# Patient Record
Sex: FEMALE | Race: BLACK OR AFRICAN AMERICAN
[De-identification: names, ages, dates, MRNs, and addresses within clinical notes are randomized per-mention and may not be internally consistent; named-entity substitution may affect disease eponyms.]

---

## 2018-08-19 ENCOUNTER — HOSPITAL ENCOUNTER (INPATIENT)
Dept: HOSPITAL 61 - ER | Age: 68
LOS: 1 days | Discharge: HOME | DRG: 287 | End: 2018-08-20
Attending: INTERNAL MEDICINE | Admitting: INTERNAL MEDICINE
Payer: COMMERCIAL

## 2018-08-19 VITALS — SYSTOLIC BLOOD PRESSURE: 107 MMHG | DIASTOLIC BLOOD PRESSURE: 69 MMHG

## 2018-08-19 VITALS — DIASTOLIC BLOOD PRESSURE: 66 MMHG | SYSTOLIC BLOOD PRESSURE: 111 MMHG

## 2018-08-19 VITALS — BODY MASS INDEX: 28.36 KG/M2 | WEIGHT: 160.06 LBS | HEIGHT: 63 IN

## 2018-08-19 VITALS — SYSTOLIC BLOOD PRESSURE: 128 MMHG | DIASTOLIC BLOOD PRESSURE: 72 MMHG

## 2018-08-19 VITALS — DIASTOLIC BLOOD PRESSURE: 71 MMHG | SYSTOLIC BLOOD PRESSURE: 109 MMHG

## 2018-08-19 DIAGNOSIS — I25.110: Primary | ICD-10-CM

## 2018-08-19 DIAGNOSIS — E78.5: ICD-10-CM

## 2018-08-19 DIAGNOSIS — Z88.2: ICD-10-CM

## 2018-08-19 DIAGNOSIS — K21.9: ICD-10-CM

## 2018-08-19 DIAGNOSIS — Z95.5: ICD-10-CM

## 2018-08-19 DIAGNOSIS — I25.2: ICD-10-CM

## 2018-08-19 DIAGNOSIS — Z82.49: ICD-10-CM

## 2018-08-19 DIAGNOSIS — I10: ICD-10-CM

## 2018-08-19 DIAGNOSIS — Z88.1: ICD-10-CM

## 2018-08-19 LAB
ANION GAP SERPL CALC-SCNC: 10 MMOL/L (ref 6–14)
APTT BLD: 28 SEC (ref 24–38)
BASOPHILS # BLD AUTO: 0 X10^3/UL (ref 0–0.2)
BASOPHILS NFR BLD: 1 % (ref 0–3)
BUN SERPL-MCNC: 24 MG/DL (ref 7–20)
CALCIUM SERPL-MCNC: 8.9 MG/DL (ref 8.5–10.1)
CHLORIDE SERPL-SCNC: 103 MMOL/L (ref 98–107)
CO2 SERPL-SCNC: 27 MMOL/L (ref 21–32)
CREAT SERPL-MCNC: 0.7 MG/DL (ref 0.6–1)
EOSINOPHIL NFR BLD: 0.1 X10^3/UL (ref 0–0.7)
EOSINOPHIL NFR BLD: 2 % (ref 0–3)
ERYTHROCYTE [DISTWIDTH] IN BLOOD BY AUTOMATED COUNT: 14 % (ref 11.5–14.5)
GFR SERPLBLD BASED ON 1.73 SQ M-ARVRAT: 83.7 ML/MIN
GLUCOSE SERPL-MCNC: 105 MG/DL (ref 70–99)
HCT VFR BLD CALC: 42.8 % (ref 36–47)
HGB BLD-MCNC: 14.4 G/DL (ref 12–15.5)
LYMPHOCYTES # BLD: 1.8 X10^3/UL (ref 1–4.8)
LYMPHOCYTES NFR BLD AUTO: 32 % (ref 24–48)
MCH RBC QN AUTO: 30 PG (ref 25–35)
MCHC RBC AUTO-ENTMCNC: 34 G/DL (ref 31–37)
MCV RBC AUTO: 90 FL (ref 79–100)
MONO #: 0.4 X10^3/UL (ref 0–1.1)
MONOCYTES NFR BLD: 8 % (ref 0–9)
NEUT #: 3.1 X10^3UL (ref 1.8–7.7)
NEUTROPHILS NFR BLD AUTO: 57 % (ref 31–73)
PLATELET # BLD AUTO: 187 X10^3/UL (ref 140–400)
POTASSIUM SERPL-SCNC: 3.6 MMOL/L (ref 3.5–5.1)
PROTHROMBIN TIME: 12.1 SEC (ref 11.7–14)
RBC # BLD AUTO: 4.75 X10^6/UL (ref 3.5–5.4)
SODIUM SERPL-SCNC: 140 MMOL/L (ref 136–145)
WBC # BLD AUTO: 5.4 X10^3/UL (ref 4–11)

## 2018-08-19 PROCEDURE — 85610 PROTHROMBIN TIME: CPT

## 2018-08-19 PROCEDURE — 36415 COLL VENOUS BLD VENIPUNCTURE: CPT

## 2018-08-19 PROCEDURE — 99152 MOD SED SAME PHYS/QHP 5/>YRS: CPT

## 2018-08-19 PROCEDURE — 71045 X-RAY EXAM CHEST 1 VIEW: CPT

## 2018-08-19 PROCEDURE — C1892 INTRO/SHEATH,FIXED,PEEL-AWAY: HCPCS

## 2018-08-19 PROCEDURE — 99153 MOD SED SAME PHYS/QHP EA: CPT

## 2018-08-19 PROCEDURE — C1769 GUIDE WIRE: HCPCS

## 2018-08-19 PROCEDURE — 93306 TTE W/DOPPLER COMPLETE: CPT

## 2018-08-19 PROCEDURE — G0269 OCCLUSIVE DEVICE IN VEIN ART: HCPCS

## 2018-08-19 PROCEDURE — 80048 BASIC METABOLIC PNL TOTAL CA: CPT

## 2018-08-19 PROCEDURE — 85730 THROMBOPLASTIN TIME PARTIAL: CPT

## 2018-08-19 PROCEDURE — 93458 L HRT ARTERY/VENTRICLE ANGIO: CPT

## 2018-08-19 PROCEDURE — C1771 REP DEV, URINARY, W/SLING: HCPCS

## 2018-08-19 PROCEDURE — 85025 COMPLETE CBC W/AUTO DIFF WBC: CPT

## 2018-08-19 PROCEDURE — 83880 ASSAY OF NATRIURETIC PEPTIDE: CPT

## 2018-08-19 PROCEDURE — 93005 ELECTROCARDIOGRAM TRACING: CPT

## 2018-08-19 PROCEDURE — 84484 ASSAY OF TROPONIN QUANT: CPT

## 2018-08-19 PROCEDURE — 85520 HEPARIN ASSAY: CPT

## 2018-08-19 RX ADMIN — NITROGLYCERIN PRN MG: 0.4 TABLET SUBLINGUAL at 09:37

## 2018-08-19 RX ADMIN — METOPROLOL SUCCINATE SCH MG: 25 TABLET, EXTENDED RELEASE ORAL at 11:59

## 2018-08-19 RX ADMIN — PANTOPRAZOLE SODIUM SCH MG: 40 TABLET, DELAYED RELEASE ORAL at 11:59

## 2018-08-19 RX ADMIN — VERAPAMIL HYDROCHLORIDE SCH MG: 180 TABLET, FILM COATED, EXTENDED RELEASE ORAL at 11:59

## 2018-08-19 RX ADMIN — NITROGLYCERIN PRN MG: 0.4 TABLET SUBLINGUAL at 09:00

## 2018-08-19 NOTE — PHYS DOC
Adult General


Chief Complaint


Chief Complaint:  CHEST PAIN-CARDIAC NATURE





HPI


HPI





Patient is a 68  year old female who presents with chest pain.  Patient c/o 

sternal chest pressure which has been stuttering over the last couple weeks.  

Pain became more constant and severe last night and persisted until she awoke 

this am.  No shortness of breath.  No KING, orthopnea, or LE edema.  No 

aggravating or alleviating factors.  Patient had MI in 2011 and states she had 

one stent placed.  No recent fever, chills, cough.





Review of Systems


Review of Systems





Constitutional: Denies fever or chills 


Eyes: Denies change in visual acuity, redness, or eye pain 


HENT: Denies nasal congestion or sore throat 


Respiratory: Denies cough or shortness of breath 


Cardiovascular: No additional information not addressed in HPI 


GI: Denies abdominal pain, nausea, vomiting, bloody stools or diarrhea 


: Denies dysuria or hematuria 


Musculoskeletal: Denies back pain or joint pain 


Integument: Denies rash or skin lesions 


Neurologic: Denies headache, focal weakness or sensory changes 


Endocrine: Denies polyuria or polydipsia 





All other systems were reviewed and found to be within normal limits, except as 

documented in this note.





Current Medications


Current Medications





Current Medications








 Medications


  (Trade)  Dose


 Ordered  Sig/Artur  Start Time


 Stop Time Status Last Admin


Dose Admin


 


 Aspirin


  (Children'S


 Aspirin)  324 mg  1X  ONCE  8/19/18 09:00


 8/19/18 09:02 DC 8/19/18 09:00


324 MG


 


 Nitroglycerin


  (Nitrostat)  0.4 mg  PRN Q5MIN  PRN  8/19/18 08:45


 8/19/18 09:52 DC 8/19/18 09:37


0.4 MG











Allergies


Allergies





Allergies








Coded Allergies Type Severity Reaction Last Updated Verified


 


  Sulfa (Sulfonamide Antibiotics) Allergy Intermediate Hives 8/19/18 Yes


 


  erythromycin base Adverse Reaction Intermediate Nausea and Vomiting 8/19/18 

Yes











Physical Exam


Physical Exam





Constitutional: Well developed, well nourished, no acute distress, non-toxic 

appearance


HENT: Normocephalic, atraumatic, bilateral external ears normal, oropharynx 

moist


Eyes: PERRLA, EOMI, conjunctiva normal 


Neck: Normal range of motion, no tenderness 


Cardiovascular:Heart rate regular rhythm, no murmur 


Lungs & Thorax:  Bilateral breath sounds clear to auscultation 


Abdomen: Bowel sounds normal, soft 


Skin: Warm, dry, no erythema, no rash 


Back: No tenderness, no CVA tenderness 


Extremities: trace edema bilateral LE's 


Neurologic: Alert and oriented X 3


Psychologic: Affect normal





Current Patient Data


Vital Signs





 Vital Signs








  Date Time  Temp Pulse Resp B/P (MAP) Pulse Ox O2 Delivery O2 Flow Rate FiO2


 


8/19/18 08:27 97.7 68 18 164/89 (114) 100 Room Air  





 97.7       








Lab Values





 Laboratory Tests








Test


 8/19/18


08:40


 


White Blood Count


 5.4 x10^3/uL


(4.0-11.0)


 


Red Blood Count


 4.75 x10^6/uL


(3.50-5.40)


 


Hemoglobin


 14.4 g/dL


(12.0-15.5)


 


Hematocrit


 42.8 %


(36.0-47.0)


 


Mean Corpuscular Volume


 90 fL ()





 


Mean Corpuscular Hemoglobin 30 pg (25-35)  


 


Mean Corpuscular Hemoglobin


Concent 34 g/dL


(31-37)


 


Red Cell Distribution Width


 14.0 %


(11.5-14.5)


 


Platelet Count


 187 x10^3/uL


(140-400)


 


Neutrophils (%) (Auto) 57 % (31-73)  


 


Lymphocytes (%) (Auto) 32 % (24-48)  


 


Monocytes (%) (Auto) 8 % (0-9)  


 


Eosinophils (%) (Auto) 2 % (0-3)  


 


Basophils (%) (Auto) 1 % (0-3)  


 


Neutrophils # (Auto)


 3.1 x10^3uL


(1.8-7.7)


 


Lymphocytes # (Auto)


 1.8 x10^3/uL


(1.0-4.8)


 


Monocytes # (Auto)


 0.4 x10^3/uL


(0.0-1.1)


 


Eosinophils # (Auto)


 0.1 x10^3/uL


(0.0-0.7)


 


Basophils # (Auto)


 0.0 x10^3/uL


(0.0-0.2)


 


Prothrombin Time


 12.1 SEC


(11.7-14.0)


 


Prothrombin Time INR 0.9 (0.8-1.1)  


 


PTT


 28 SEC (24-38)





 


Sodium Level


 140 mmol/L


(136-145)


 


Potassium Level


 3.6 mmol/L


(3.5-5.1)


 


Chloride Level


 103 mmol/L


()


 


Carbon Dioxide Level


 27 mmol/L


(21-32)


 


Anion Gap 10 (6-14)  


 


Blood Urea Nitrogen


 24 mg/dL


(7-20)  H


 


Creatinine


 0.7 mg/dL


(0.6-1.0)


 


Estimated GFR


(Cockcroft-Gault) 83.7  





 


Glucose Level


 105 mg/dL


(70-99)  H


 


Calcium Level


 8.9 mg/dL


(8.5-10.1)


 


Troponin I Quantitative


 0.174 ng/mL


(0.000-0.055)


 


NT-Pro-B-Type Natriuretic


Peptide 129 pg/mL


(0-124)  H





 Laboratory Tests


8/19/18 08:40








 Laboratory Tests


8/19/18 08:40











EKG


EKG


No STEMI


Interpretation Time:


08:30





Radiology/Procedures


Radiology/Procedures


no acute findings on CXR





Course & Med Decision Making


Course & Med Decision Making


Pertinent Labs and Imaging studies reviewed. (See chart for details)





Patient is seen immediately on arrival to her room.  + CP.  Nitro and ASA 

ordered.  EKG does not reveal STEMI.





09:30:  All results are reviewed. The patient does not have acute changes in 

her EKG. She does have elevated troponin however. She is ordered to have 

heparin bolus and drip. I discussed this patient with the hospital physician, 

Dr. Peralta, and the patient will be admitted. Cardiology consult was placed.





Dragon Disclaimer


Dragon Disclaimer


This electronic medical record was generated, in whole or in part, using a 

voice recognition dictation system.





Departure


Departure


Referrals:  


SOPHIA RAMOS (PCP)











PHOENIX WATKINS DO Aug 19, 2018 08:58

## 2018-08-19 NOTE — RAD
Chest AP portable at 0844:

 

Reason for examination: Mid chest pain.

 

Comparison is made to previous study dated 8/15/2013.

 

The heart size is normal. Mediastinum is unremarkable. Lung fields are 

clear. No acute bony abnormalities are seen.

 

Impression:

 

No acute cardiopulmonary disease.

 

Electronically signed by: Gail Fraser MD (8/19/2018 8:53 AM) [facility]

## 2018-08-19 NOTE — HP
ADMIT DATE:  08/19/2018



CHIEF COMPLAINT:  Chest pain.



HISTORY OF PRESENT ILLNESS:  The patient is a pleasant 68-year-old female who

presented with chest pain, rated 7/10.  She has associated weakness and nausea. 

It has been occurring off and on for a few hours.  She came in by ambulance. 

While she was in the ER, she was noted to have elevated troponin of 0.17.  I

have discussed the case with ER physician.  We are going to admit the patient

with consultation to Cardiology.



PAST MEDICAL HISTORY:  Hyperlipidemia, hypertension, GERD, coronary artery

disease with a previous stent.



ALLERGIES:  SULFA AND ERYTHROMYCIN.



FAMILY HISTORY:  Coronary artery disease.



SOCIAL HISTORY:  She does not drink, smoke or take drugs.



MEDICATIONS:  Reviewed, please refer to the MRAD.



REVIEW OF SYSTEMS:

GENERAL:  No history of weight change, weakness or fevers.

SKIN:  No bruising, hair changes or rashes.

EYES:  No blurred, double or loss of vision.

NOSE AND THROAT:  No history of nosebleeds, hoarseness or sore throat.

HEART:  The patient complains of chest pain.

LUNGS:  Denies cough, hemoptysis, wheezing or shortness of breath.

GASTROINTESTINAL:  Denies changes in appetite, nausea, vomiting, diarrhea or

constipation.

GENITOURINARY:  No history of frequency, urgency, hesitancy or nocturia.

NEUROLOGIC:  Denies history of numbness, tingling, tremor or weakness.

PSYCHIATRIC:  No history of panic, anxiety or depression.

ENDOCRINE:  No history of heat or cold intolerance, polyuria or polydipsia.

EXTREMITIES:  Denies muscle weakness, joint pain, pain on walking or stiffness.



PHYSICAL EXAMINATION:

VITAL SIGNS:  Temperature afebrile, pulse 50, respirations 16, blood pressure

120/72.

GENERAL:  She is alert, cooperative.

HEART:  Normal S1, S2.

LUNGS:  Clear.

ABDOMEN:  Soft.

EXTREMITIES:  No edema.

SKIN:  No rash.

ENDOCRINE:  No thyromegaly.

LYMPHATICS:  No cervical nodes.

HEMATOPOIETIC:  No bruising.



LABORATORY DATA:  Hematology is normal.  Electrolytes are normal other than a

BUN of 24.  Troponin is 0.174.



ASSESSMENT AND PLAN:  Acute myocardial infarction.  The patient is being

admitted.  We will consult Cardiology, serial enzymes, serial EKGs, IV heparin. 

Suspect she might need to go to the cath lab.  We are awaiting Dr. Davis's

input.

 



______________________________

JACQUI CHRISTINA DO



DR:  Elfego  JOB#:  [id number] / [id number]

DD:  08/19/2018 13:31  DT:  08/19/2018 14:52

## 2018-08-19 NOTE — EKG
[facility]

              [address] [place] [address]

Test Date:    2018               Test Time:    08:28:53

Pat Name:     ML ARAUZ          Department:   

Patient ID:   [id number]           Room:          

Gender:       F                        Technician:   

:          1950               Requested By: PHOENIX WATKINS

Order Number: [id number]           Reading MD:   Kobe Garces MD

                                 Measurements

Intervals                              Axis          

Rate:         70                       P:            21

CO:           190                      QRS:          -26

QRSD:         88                       T:            0

QT:           398                                    

QTc:          433                                    

                           Interpretive Statements

SINUS RHYTHM



Electronically Signed On 2018 10:06:22 CDT by Kobe Garces MD

## 2018-08-19 NOTE — PDOC2
CONSULT


Date of Consult


Date of Consult


DATE: 8/19/18 


TIME: 12:18





Reason for Consult


Reason for Consult:


Chest pain





Referring Physician


Referring Physician:


Dr. Peralta





Identification/Chief Complaint


Chief Complaint


Chest pain





Source


Source:  Chart review, Patient





History of Present Illness


Reason for Visit:


68-year-old female presented with retrosternal chest pressure slightly worse 

with exertion has been going on for last 1-2 weeks but was worse last night. 

The pain was relieved with nitroglycerin in the emergency room. She denied any 

orthopnea/PND, palpitations or syncope.





Past Medical History


Past Medical History


Coronary artery disease s/p PCI/stent in 2011 


Hypertension


Hyperlipidemia


Gastroesophageal reflux disease





Family History


Family History


Coronary artery disease and hypertension





Social History


Social History


Patient is a nonsmoker and a nondrinker





Current Medications


Current Medications





Current Medications


Aspirin (Children'S Aspirin) 324 mg 1X  ONCE PO  Last administered on 8/19/18at 

09:00;  Start 8/19/18 at 09:00;  Stop 8/19/18 at 09:02;  Status DC


Nitroglycerin (Nitrostat) 0.4 mg PRN Q5MIN  PRN SL CHEST PAIN Last administered 

on 8/19/18at 09:37;  Start 8/19/18 at 08:45;  Stop 8/19/18 at 09:52;  Status DC


Heparin Sodium (Porcine) (Heparin Sodium) 4,000 unit 1X  ONCE IV  Last 

administered on 8/19/18at 10:17;  Start 8/19/18 at 10:00;  Stop 8/19/18 at 10:01

;  Status DC


Heparin Sodium/ Dextrose 500 ml @ 0 mls/hr CONT  PRN IV SEE I/O RECORD Last 

administered on 8/19/18at 10:25;  Start 8/19/18 at 10:00


Ondansetron HCl (Zofran) 4 mg PRN Q8HRS  PRN IV NAUSEA/VOMITING;  Start 8/19/18 

at 10:00;  Stop 8/20/18 at 09:59


Morphine Sulfate (Morphine Sulfate) 4 mg PRN Q2HR  PRN IV PAIN Last 

administered on 8/19/18at 10:58;  Start 8/19/18 at 10:00;  Stop 8/20/18 at 09:59


Nitroglycerin (Nitrostat) 0.4 mg PRN Q5MIN  PRN SL CHEST PAIN;  Start 8/19/18 

at 10:00;  Stop 8/20/18 at 09:59


Aspirin (Children'S Aspirin) 81 mg DAILY PO ;  Start 8/20/18 at 09:00


Metoprolol Succinate (Toprol Xl) 25 mg DAILY PO ;  Start 8/19/18 at 12:30


Pantoprazole Sodium (Protonix) 40 mg DAILYAC PO ;  Start 8/19/18 at 12:30


Verapamil HCl (Calan Sr) 180 mg DAILY PO ;  Start 8/19/18 at 12:30





Active Scripts


Active


Reported


Verapamil Er (Verapamil Hcl) 180 Mg Tablet.er 180 Mg PO DAILY


Omeprazole 20 Mg Tablet.dr 20 Mg PO DAILY


Crestor (Rosuvastatin Calcium) 40 Mg Tablet 40 Mg PO HS


Pfeifer 3 Fish Oil Softgel (Omega-3 Fatty Acids/Fish Oil) 1 Each Capsule.dr 

Unknown Dose PO DAILY


Metoprolol Succinate ( Xl ) (Metoprolol Succinate) 25 Mg Tab.er.24h Unknown 

Dose PO DAILY


Aspirin 81 Mg Tab.chew 1 Tab PO DAILY





Allergies


Allergies:  


Coded Allergies:  


     Sulfa (Sulfonamide Antibiotics) (Verified  Allergy, Intermediate, Hives, 8/ 19/18)


     erythromycin base (Verified  Adverse Reaction, Intermediate, Nausea and 

Vomiting, 8/19/18)





ROS


PSYCHOLOGICAL ROS:  No: Hallucinations


Eyes:  No Loss of vision


HEENT:  No: Epistaxis


Respiratory:  No: Hemoptysis


Cardiovascular:  yes Chest Pain


Gastrointestinal:  No Vomiting


Genitourinary:  No Hematuria


Neurological:  No Seizures


Skin:  No Rash





Physical Exam


General:  Alert, Oriented X3


HEENT:  Atraumatic, PERRLA


Lungs:  Clear to auscultation


Heart:  Regular rate


Abdomen:  Soft


Extremities:  No edema


Psych/Mental Status:  Mood NL





Vitals


VITALS





Vital Signs








  Date Time  Temp Pulse Resp B/P (MAP) Pulse Ox O2 Delivery O2 Flow Rate FiO2


 


8/19/18 11:34   16  98 Room Air  


 


8/19/18 11:32 98.7 55  128/72 (90)    





 98.7       











Labs


Labs





Laboratory Tests








Test


 8/19/18


08:40


 


White Blood Count


 5.4 x10^3/uL


(4.0-11.0)


 


Red Blood Count


 4.75 x10^6/uL


(3.50-5.40)


 


Hemoglobin


 14.4 g/dL


(12.0-15.5)


 


Hematocrit


 42.8 %


(36.0-47.0)


 


Mean Corpuscular Volume 90 fL () 


 


Mean Corpuscular Hemoglobin 30 pg (25-35) 


 


Mean Corpuscular Hemoglobin


Concent 34 g/dL


(31-37)


 


Red Cell Distribution Width


 14.0 %


(11.5-14.5)


 


Platelet Count


 187 x10^3/uL


(140-400)


 


Neutrophils (%) (Auto) 57 % (31-73) 


 


Lymphocytes (%) (Auto) 32 % (24-48) 


 


Monocytes (%) (Auto) 8 % (0-9) 


 


Eosinophils (%) (Auto) 2 % (0-3) 


 


Basophils (%) (Auto) 1 % (0-3) 


 


Neutrophils # (Auto)


 3.1 x10^3uL


(1.8-7.7)


 


Lymphocytes # (Auto)


 1.8 x10^3/uL


(1.0-4.8)


 


Monocytes # (Auto)


 0.4 x10^3/uL


(0.0-1.1)


 


Eosinophils # (Auto)


 0.1 x10^3/uL


(0.0-0.7)


 


Basophils # (Auto)


 0.0 x10^3/uL


(0.0-0.2)


 


Prothrombin Time


 12.1 SEC


(11.7-14.0)


 


Prothromb Time International


Ratio 0.9 (0.8-1.1) 





 


Activated Partial


Thromboplast Time 28 SEC (24-38) 





 


Sodium Level


 140 mmol/L


(136-145)


 


Potassium Level


 3.6 mmol/L


(3.5-5.1)


 


Chloride Level


 103 mmol/L


()


 


Carbon Dioxide Level


 27 mmol/L


(21-32)


 


Anion Gap 10 (6-14) 


 


Blood Urea Nitrogen


 24 mg/dL


(7-20)


 


Creatinine


 0.7 mg/dL


(0.6-1.0)


 


Estimated GFR


(Cockcroft-Gault) 83.7 





 


Glucose Level


 105 mg/dL


(70-99)


 


Calcium Level


 8.9 mg/dL


(8.5-10.1)


 


Troponin I Quantitative


 0.174 ng/mL


(0.000-0.055)


 


NT-Pro-B-Type Natriuretic


Peptide 129 pg/mL


(0-124)








Laboratory Tests








Test


 8/19/18


08:40


 


White Blood Count


 5.4 x10^3/uL


(4.0-11.0)


 


Red Blood Count


 4.75 x10^6/uL


(3.50-5.40)


 


Hemoglobin


 14.4 g/dL


(12.0-15.5)


 


Hematocrit


 42.8 %


(36.0-47.0)


 


Mean Corpuscular Volume 90 fL () 


 


Mean Corpuscular Hemoglobin 30 pg (25-35) 


 


Mean Corpuscular Hemoglobin


Concent 34 g/dL


(31-37)


 


Red Cell Distribution Width


 14.0 %


(11.5-14.5)


 


Platelet Count


 187 x10^3/uL


(140-400)


 


Neutrophils (%) (Auto) 57 % (31-73) 


 


Lymphocytes (%) (Auto) 32 % (24-48) 


 


Monocytes (%) (Auto) 8 % (0-9) 


 


Eosinophils (%) (Auto) 2 % (0-3) 


 


Basophils (%) (Auto) 1 % (0-3) 


 


Neutrophils # (Auto)


 3.1 x10^3uL


(1.8-7.7)


 


Lymphocytes # (Auto)


 1.8 x10^3/uL


(1.0-4.8)


 


Monocytes # (Auto)


 0.4 x10^3/uL


(0.0-1.1)


 


Eosinophils # (Auto)


 0.1 x10^3/uL


(0.0-0.7)


 


Basophils # (Auto)


 0.0 x10^3/uL


(0.0-0.2)


 


Prothrombin Time


 12.1 SEC


(11.7-14.0)


 


Prothromb Time International


Ratio 0.9 (0.8-1.1) 





 


Activated Partial


Thromboplast Time 28 SEC (24-38) 





 


Sodium Level


 140 mmol/L


(136-145)


 


Potassium Level


 3.6 mmol/L


(3.5-5.1)


 


Chloride Level


 103 mmol/L


()


 


Carbon Dioxide Level


 27 mmol/L


(21-32)


 


Anion Gap 10 (6-14) 


 


Blood Urea Nitrogen


 24 mg/dL


(7-20)


 


Creatinine


 0.7 mg/dL


(0.6-1.0)


 


Estimated GFR


(Cockcroft-Gault) 83.7 





 


Glucose Level


 105 mg/dL


(70-99)


 


Calcium Level


 8.9 mg/dL


(8.5-10.1)


 


Troponin I Quantitative


 0.174 ng/mL


(0.000-0.055)


 


NT-Pro-B-Type Natriuretic


Peptide 129 pg/mL


(0-124)











Assessment/Plan


Assessment/Plan


1.  Unstable angina in a patient with known history of coronary artery disease s

/p PCI/stent in 2011. EKG without acute changes. Patient is presently chest pain

-free. Continue heparin infusion per protocol and plan for cardiac 

catheterization and possible angioplasty tomorrow. Risks and benefits were 

explained.


2.  Hypertension: Better controlled since admission


3.  Hyperlipidemia:  Statins therapy


Thank you for your consultation











CLARITA MENENDEZ MD Aug 19, 2018 12:19

## 2018-08-20 VITALS — SYSTOLIC BLOOD PRESSURE: 103 MMHG | DIASTOLIC BLOOD PRESSURE: 60 MMHG

## 2018-08-20 VITALS — SYSTOLIC BLOOD PRESSURE: 118 MMHG | DIASTOLIC BLOOD PRESSURE: 60 MMHG

## 2018-08-20 VITALS — DIASTOLIC BLOOD PRESSURE: 67 MMHG | SYSTOLIC BLOOD PRESSURE: 131 MMHG

## 2018-08-20 VITALS — SYSTOLIC BLOOD PRESSURE: 108 MMHG | DIASTOLIC BLOOD PRESSURE: 68 MMHG

## 2018-08-20 VITALS — DIASTOLIC BLOOD PRESSURE: 61 MMHG | SYSTOLIC BLOOD PRESSURE: 114 MMHG

## 2018-08-20 VITALS — SYSTOLIC BLOOD PRESSURE: 111 MMHG | DIASTOLIC BLOOD PRESSURE: 61 MMHG

## 2018-08-20 VITALS — DIASTOLIC BLOOD PRESSURE: 75 MMHG | SYSTOLIC BLOOD PRESSURE: 116 MMHG

## 2018-08-20 VITALS — SYSTOLIC BLOOD PRESSURE: 126 MMHG | DIASTOLIC BLOOD PRESSURE: 67 MMHG

## 2018-08-20 VITALS — SYSTOLIC BLOOD PRESSURE: 119 MMHG | DIASTOLIC BLOOD PRESSURE: 73 MMHG

## 2018-08-20 VITALS — SYSTOLIC BLOOD PRESSURE: 115 MMHG | DIASTOLIC BLOOD PRESSURE: 67 MMHG

## 2018-08-20 LAB
ANION GAP SERPL CALC-SCNC: 6 MMOL/L (ref 6–14)
BASOPHILS # BLD AUTO: 0 X10^3/UL (ref 0–0.2)
BASOPHILS NFR BLD: 1 % (ref 0–3)
BUN SERPL-MCNC: 18 MG/DL (ref 7–20)
CALCIUM SERPL-MCNC: 8.5 MG/DL (ref 8.5–10.1)
CHLORIDE SERPL-SCNC: 106 MMOL/L (ref 98–107)
CO2 SERPL-SCNC: 29 MMOL/L (ref 21–32)
CREAT SERPL-MCNC: 0.7 MG/DL (ref 0.6–1)
EOSINOPHIL NFR BLD: 0.1 X10^3/UL (ref 0–0.7)
EOSINOPHIL NFR BLD: 2 % (ref 0–3)
ERYTHROCYTE [DISTWIDTH] IN BLOOD BY AUTOMATED COUNT: 13.9 % (ref 11.5–14.5)
GFR SERPLBLD BASED ON 1.73 SQ M-ARVRAT: 101.3 ML/MIN
GLUCOSE SERPL-MCNC: 98 MG/DL (ref 70–99)
HCT VFR BLD CALC: 38.8 % (ref 36–47)
HGB BLD-MCNC: 13.1 G/DL (ref 12–15.5)
LYMPHOCYTES # BLD: 1.8 X10^3/UL (ref 1–4.8)
LYMPHOCYTES NFR BLD AUTO: 37 % (ref 24–48)
MCH RBC QN AUTO: 31 PG (ref 25–35)
MCHC RBC AUTO-ENTMCNC: 34 G/DL (ref 31–37)
MCV RBC AUTO: 91 FL (ref 79–100)
MONO #: 0.4 X10^3/UL (ref 0–1.1)
MONOCYTES NFR BLD: 9 % (ref 0–9)
NEUT #: 2.5 X10^3UL (ref 1.8–7.7)
NEUTROPHILS NFR BLD AUTO: 52 % (ref 31–73)
PLATELET # BLD AUTO: 163 X10^3/UL (ref 140–400)
POTASSIUM SERPL-SCNC: 3.9 MMOL/L (ref 3.5–5.1)
RBC # BLD AUTO: 4.28 X10^6/UL (ref 3.5–5.4)
SODIUM SERPL-SCNC: 141 MMOL/L (ref 136–145)
WBC # BLD AUTO: 4.8 X10^3/UL (ref 4–11)

## 2018-08-20 PROCEDURE — B2111ZZ FLUOROSCOPY OF MULTIPLE CORONARY ARTERIES USING LOW OSMOLAR CONTRAST: ICD-10-PCS | Performed by: INTERNAL MEDICINE

## 2018-08-20 PROCEDURE — B2151ZZ FLUOROSCOPY OF LEFT HEART USING LOW OSMOLAR CONTRAST: ICD-10-PCS | Performed by: INTERNAL MEDICINE

## 2018-08-20 PROCEDURE — 4A023N7 MEASUREMENT OF CARDIAC SAMPLING AND PRESSURE, LEFT HEART, PERCUTANEOUS APPROACH: ICD-10-PCS | Performed by: INTERNAL MEDICINE

## 2018-08-20 RX ADMIN — METOPROLOL SUCCINATE SCH MG: 25 TABLET, EXTENDED RELEASE ORAL at 09:00

## 2018-08-20 RX ADMIN — VERAPAMIL HYDROCHLORIDE SCH MG: 180 TABLET, FILM COATED, EXTENDED RELEASE ORAL at 09:00

## 2018-08-20 RX ADMIN — PANTOPRAZOLE SODIUM SCH MG: 40 TABLET, DELAYED RELEASE ORAL at 08:59

## 2018-08-20 NOTE — PDOC3
Discharge Summary [facility]


Date of Admission:  Aug 19, 2018


Discharge Date:  Aug 20, 2018


Admitting Diagnosis





unstable angina


h/o CAD WITH 1 stent


htn


hld


CONSULTS


card


Brief Hospital Course


Ms. Lo  is a 68 old f, with h/o stent, comes for chest pain CE neg. 


on heparin drip, now pain free.


cath done, neg.


dc home .


dc time 35min 








 


GENERAL:  She is alert, cooperative.


HEART:  Normal S1, S2.


LUNGS:  Clear.


ABDOMEN:  Soft.


EXTREMITIES:  No edema.


SKIN:  No rash.


ENDOCRINE:  No thyromegaly.


LYMPHATICS:  No cervical nodes.


HEMATOPOIETIC:  No bruising.


Disposition


home


CONDITION AT DISCHARGE:  Improved


Scheduled


Aspirin (Aspirin), 1 TAB PO DAILY, (Reported)


Metoprolol Succinate (Metoprolol Succinate ( Xl )), Unknown Dose PO DAILY, (

Reported)


Omega-3 Fatty Acids/Fish Oil (Omega 3 Fish Oil Softgel), Unknown Dose PO DAILY, 

(Reported)


Omeprazole (Omeprazole), 20 MG PO DAILY, (Reported)


Rosuvastatin Calcium (Crestor), 40 MG PO HS, (Reported)


Verapamil Hcl (Verapamil Er), 180 MG PO DAILY, (Reported)





Discontinued Medications


Omeprazole (Omeprazole), Unknown Dose PO DAILY, (Reported)


   Discontinued Reason: Prescription changed


Rosuvastatin Calcium (Crestor), Unknown Dose PO DAILY, (Reported)


   Discontinued Reason: Prescription changed


Verapamil Hcl (Verapamil Hcl), Unknown Dose PO BID, (Reported)


   Discontinued Reason: Prescription changed











JOSÉ GUSTAFSON MD Aug 20, 2018 15:09

## 2018-08-20 NOTE — PDOC
MODERATE SEDATION ASSESSMENT


RISKS/ALTERNATIVES


Risks/Alternatives


Risks and alternatives of this type of sedation and procedure discussed with:


RISK/ALTERNATIVES:  Patient





H & P ON CHART


H & P


H & P on chart and reviewed for co-morbid conditions and appropriate labs.


H&P ON CHART:  Yes





PREGNANCY STATUS


PREG STATUS ASSESSED:  N/A





MEDS/ALLERGIES REVIEWED


Meds/Allergies Reviewed


Medications and Allergies including time and route of recently administered 

narcotics and sedatives.


MEDS/ALLERGIES REVIEWED:  Yes





ASA RATING


ASA RATING:  III





AIRWAY ASSESSMENT


Airway Assessment


Airway patency, oral function limitations, presence  of caps, crowns, dentures, 

partials, and ability to extend neck assessed.


AIRWAY ASSESSMENT:  Yes





MALLAMPATI SCORE


MALLAMPATI SCORE:  II





PRE-SEDATION ASSESSMENT


PRE-SEDATION ASSESSMENT:  Yes











CLARITA MENENDEZ MD Aug 20, 2018 12:10

## 2018-08-20 NOTE — CARD
MR#: [id number]

Account#: [id number]

Accession#: [id number]

Date of Study: 08/20/2018

Ordering Physician: CLARITA MENENDEZ, 

Referring Physician: JACQUI CHRISTINA 

Tech: Betty Juan





--------------- APPROVED REPORT --------------





EXAM: Two-dimensional and M-mode echocardiogram with Doppler and color Doppler.



Other Information 

Quality : GoodHR: 55bpm



INDICATION

Non STEMI



2D DIMENSIONS 

RVDd1.6 (2.9-3.5cm)Left Atrium(2D)2.9 (1.6-4.0cm)

IVSd0.8 (0.7-1.1cm)Aortic Root(2D)3.0 (2.0-3.7cm)

LVDd4.3 (3.9-5.9cm)LVOT Diameter1.9 (1.8-2.4cm)

PWd0.7 (0.7-1.1cm)LVDs2.3 (2.5-4.0cm)

FS (%) 47.0 %SV66.6 ml

LVEF(%)78.7 (>50%)



Aortic Valve

AoV Peak Cosme.136.2cm/sAoV VTI32.4cm

AO Peak GR.7.4mmHgLVOT  VTI 21.44cm

AO Mean GR.5mmHg



Mitral Valve

MV E Ajdhtunz02.5cm/sMV DECEL [id number]

MV A Hjlcjltg66.3cm/sE/A  Ratio0.9



TDI

Lateral E' P. V12.57cm/sMedial E' P. V13.81cm/s

E/Lateral E'6.0E/Medial E'5.5



Tricuspid Valve

TR P. Jlbxhntl205wh/sRAP HIPTXOED0tmLw

TR Peak Gr.66gbRnVPLA76kcNt



Pulmonary Vein

S1 Ixiqbqlt46.1cm/s



 LEFT VENTRICLE 

The left ventricle is normal size. There is normal left ventricular wall thickness. The left ventricu
lar systolic function is normal and the ejection fraction is within normal range. The Ejection Fracti
on is 55-60%. There is normal LV segmental wall motion. Transmitral Doppler flow pattern is Grade II-
pseudonormal filling dynamics.



 RIGHT VENTRICLE 

The right ventricle is normal size. There is normal right ventricular wall thickness. The right ventr
icular systolic function is normal.



 ATRIA 

The left atrium size is normal. The right atrium size is normal. The interatrial septum is intact wit
h no evidence for an atrial septal defect or patent foramen ovale as noted on 2-D or Doppler imaging.




 AORTIC VALVE 

The aortic valve is sclerotic. Doppler and Color Flow revealed no significant aortic regurgitation. T
here is no significant aortic valvular stenosis.



 MITRAL VALVE 

The mitral valve is normal in structure and function. There is no mitral valve stenosis. Doppler and 
Color Flow revealed no mitral valve regurgitation noted.



 TRICUSPID VALVE 

The tricuspid valve is normal in structure and function. Doppler and Color Flow revealed trace tricus
pid regurgitation. There is no tricuspid valve stenosis. 



 PULMONIC VALVE 

Doppler and Color Flow revealed no pulmonic valvular regurgitation. There is no pulmonic valvular dieudonne
nosis.



 GREAT VESSELS 

The aortic root is normal in size.



 PERICARDIAL EFFUSION 

There is no evidence of significant pericardial effusion.



Critical Notification

Critical Value: No



<Conclusion>

The left ventricular systolic function is normal and the ejection fraction is within normal range. Th
e Ejection Fraction is 55-60%.

There is normal LV segmental wall motion.



Signed by : Kobe Garces, 

Electronically Approved : 08/20/2018 17:06:43

## 2018-08-20 NOTE — CARD
MR#: [id number]

Account#: [id number]

Accession#: [id number]

Date of Study: 08/20/2018

Ordering Physician: CLARITA MENENDEZ, 

Referring Physician: JACQUI CHRISTINA 

Tech: RT Trell (R)





--------------- APPROVED REPORT --------------





Technologist: Selene Medley RT (R)

Nurse: Bertha Vivar R.N.



Procedure(s) performed: Left heart catheterization, selective coronary angiography and left ventricul
ography

Moderate sedation: 34 min



INDICATION

The indication(s) include : unstable angina .



PROCEDURE NARRATIVE

After explaining the risks, benefits and alternative options, informed consent was obtained from leonora
ent.  Patient was brought to the cardiac Cath Lab and her right groin was prepped and draped in the u
sual fashion. 20 mL of 2% lidocaine was infiltrated into the skin and subcutaneous tissues for local 
anesthesia. Arterial access was obtained in the right common femoral artery and a 6 Guinean sheath was
 inserted. 6 Guinean JL4 and 6 Guinean JR4 catheters were used to perform selective angiography of the 
left and right coronary arteries. Guinean pigtail catheter was used to perform left ventriculography. 
 Patient tolerated the procedure well. Hemostasis was achieved using Angio-Seal. There were no immedi
ate complications.  The following findings were noted.



FINDINGS

1.  Hemodynamics: Left ventricular end-diastolic pressure of 16 mmHg.  No pullback gradient across th
e aortic valve.

2.  Left ventriculography:  Normal left ventricle systolic function with ejection fraction estimated 
at 60%.  No significant mitral regurgitation seen.

3.  Coronary angiography:

a.  The left main coronary artery arose from the left sinus of Valsalva, gave rise to the left anteri
or descending and left circumflex arteries and did not show any significant stenosis.

b.  The left anterior descending artery showed widely patent previously placed stent in the midsegmen
t.

c.  The left circumflex artery did not show any significant stenosis.

d.  The right coronary artery was a large and dominant vessel arising from the right sinus of Valsalv
a that did not show any significant stenosis.



Conclusion

1.  Widely patent previously placed stent in the left anterior descending artery without any signific
ant stenosis.

2.  Normal left ventricle systolic function with ejection fraction estimated at 60%.



Recommendations

Medical Therapy



Signed by : Clarita Pasnoori, 

Electronically Approved : 08/20/2018 14:35:15

## 2022-03-13 ENCOUNTER — HOSPITAL ENCOUNTER (OUTPATIENT)
Dept: HOSPITAL 61 - ER | Age: 72
Setting detail: OBSERVATION
LOS: 1 days | Discharge: HOME | End: 2022-03-14
Attending: STUDENT IN AN ORGANIZED HEALTH CARE EDUCATION/TRAINING PROGRAM | Admitting: STUDENT IN AN ORGANIZED HEALTH CARE EDUCATION/TRAINING PROGRAM
Payer: COMMERCIAL

## 2022-03-13 VITALS — DIASTOLIC BLOOD PRESSURE: 77 MMHG | SYSTOLIC BLOOD PRESSURE: 140 MMHG

## 2022-03-13 VITALS — BODY MASS INDEX: 27.49 KG/M2 | HEIGHT: 62.5 IN | WEIGHT: 153.22 LBS

## 2022-03-13 VITALS — SYSTOLIC BLOOD PRESSURE: 115 MMHG | DIASTOLIC BLOOD PRESSURE: 63 MMHG

## 2022-03-13 VITALS — DIASTOLIC BLOOD PRESSURE: 69 MMHG | SYSTOLIC BLOOD PRESSURE: 128 MMHG

## 2022-03-13 VITALS — DIASTOLIC BLOOD PRESSURE: 67 MMHG | SYSTOLIC BLOOD PRESSURE: 107 MMHG

## 2022-03-13 DIAGNOSIS — I10: ICD-10-CM

## 2022-03-13 DIAGNOSIS — Z95.5: ICD-10-CM

## 2022-03-13 DIAGNOSIS — I25.10: ICD-10-CM

## 2022-03-13 DIAGNOSIS — Z98.890: ICD-10-CM

## 2022-03-13 DIAGNOSIS — E78.5: ICD-10-CM

## 2022-03-13 DIAGNOSIS — K21.9: ICD-10-CM

## 2022-03-13 DIAGNOSIS — Z79.82: ICD-10-CM

## 2022-03-13 DIAGNOSIS — I25.2: ICD-10-CM

## 2022-03-13 DIAGNOSIS — Z79.899: ICD-10-CM

## 2022-03-13 DIAGNOSIS — R07.89: Primary | ICD-10-CM

## 2022-03-13 DIAGNOSIS — E78.00: ICD-10-CM

## 2022-03-13 LAB
ALBUMIN SERPL-MCNC: 3.7 G/DL (ref 3.4–5)
ALBUMIN/GLOB SERPL: 0.9 {RATIO} (ref 1–1.7)
ALP SERPL-CCNC: 91 U/L (ref 46–116)
ALT SERPL-CCNC: 43 U/L (ref 14–59)
ANION GAP SERPL CALC-SCNC: 11 MMOL/L (ref 6–14)
AST SERPL-CCNC: 27 U/L (ref 15–37)
BASOPHILS # BLD AUTO: 0 X10^3/UL (ref 0–0.2)
BASOPHILS NFR BLD: 1 % (ref 0–3)
BILIRUB SERPL-MCNC: 0.5 MG/DL (ref 0.2–1)
BUN SERPL-MCNC: 13 MG/DL (ref 7–20)
BUN/CREAT SERPL: 19 (ref 6–20)
CALCIUM SERPL-MCNC: 8.9 MG/DL (ref 8.5–10.1)
CHLORIDE SERPL-SCNC: 104 MMOL/L (ref 98–107)
CO2 SERPL-SCNC: 28 MMOL/L (ref 21–32)
CREAT SERPL-MCNC: 0.7 MG/DL (ref 0.6–1)
EOSINOPHIL NFR BLD: 0.1 X10^3/UL (ref 0–0.7)
EOSINOPHIL NFR BLD: 1 % (ref 0–3)
ERYTHROCYTE [DISTWIDTH] IN BLOOD BY AUTOMATED COUNT: 14.3 % (ref 11.5–14.5)
GFR SERPLBLD BASED ON 1.73 SQ M-ARVRAT: 100.4 ML/MIN
GLUCOSE SERPL-MCNC: 124 MG/DL (ref 70–99)
HCT VFR BLD CALC: 43.5 % (ref 36–47)
HGB BLD-MCNC: 13.8 G/DL (ref 12–15.5)
LIPASE: 91 U/L (ref 73–393)
LYMPHOCYTES # BLD: 1.7 X10^3/UL (ref 1–4.8)
LYMPHOCYTES NFR BLD AUTO: 29 % (ref 24–48)
MAGNESIUM SERPL-MCNC: 1.9 MG/DL (ref 1.8–2.4)
MCH RBC QN AUTO: 30 PG (ref 25–35)
MCHC RBC AUTO-ENTMCNC: 32 G/DL (ref 31–37)
MCV RBC AUTO: 95 FL (ref 79–100)
MONO #: 0.5 X10^3/UL (ref 0–1.1)
MONOCYTES NFR BLD: 8 % (ref 0–9)
NEUT #: 3.7 X10^3/UL (ref 1.8–7.7)
NEUTROPHILS NFR BLD AUTO: 62 % (ref 31–73)
PLATELET # BLD AUTO: 150 X10^3/UL (ref 140–400)
POTASSIUM SERPL-SCNC: 3.7 MMOL/L (ref 3.5–5.1)
PROT SERPL-MCNC: 7.6 G/DL (ref 6.4–8.2)
RBC # BLD AUTO: 4.6 X10^6/UL (ref 3.5–5.4)
SODIUM SERPL-SCNC: 143 MMOL/L (ref 136–145)
WBC # BLD AUTO: 6 X10^3/UL (ref 4–11)

## 2022-03-13 PROCEDURE — 93005 ELECTROCARDIOGRAM TRACING: CPT

## 2022-03-13 PROCEDURE — 36415 COLL VENOUS BLD VENIPUNCTURE: CPT

## 2022-03-13 PROCEDURE — 83880 ASSAY OF NATRIURETIC PEPTIDE: CPT

## 2022-03-13 PROCEDURE — 85379 FIBRIN DEGRADATION QUANT: CPT

## 2022-03-13 PROCEDURE — 99285 EMERGENCY DEPT VISIT HI MDM: CPT

## 2022-03-13 PROCEDURE — 71275 CT ANGIOGRAPHY CHEST: CPT

## 2022-03-13 PROCEDURE — G0379 DIRECT REFER HOSPITAL OBSERV: HCPCS

## 2022-03-13 PROCEDURE — 83735 ASSAY OF MAGNESIUM: CPT

## 2022-03-13 PROCEDURE — 84484 ASSAY OF TROPONIN QUANT: CPT

## 2022-03-13 PROCEDURE — 71045 X-RAY EXAM CHEST 1 VIEW: CPT

## 2022-03-13 PROCEDURE — 83690 ASSAY OF LIPASE: CPT

## 2022-03-13 PROCEDURE — 85025 COMPLETE CBC W/AUTO DIFF WBC: CPT

## 2022-03-13 PROCEDURE — G0378 HOSPITAL OBSERVATION PER HR: HCPCS

## 2022-03-13 PROCEDURE — 80053 COMPREHEN METABOLIC PANEL: CPT

## 2022-03-13 RX ADMIN — SENNOSIDES AND DOCUSATE SODIUM SCH TAB: 8.6; 5 TABLET ORAL at 22:10

## 2022-03-13 RX ADMIN — HEPARIN SODIUM SCH UNIT: 5000 INJECTION, SOLUTION INTRAVENOUS; SUBCUTANEOUS at 22:00

## 2022-03-13 RX ADMIN — NITROGLYCERIN PRN MG: 0.4 TABLET SUBLINGUAL at 11:35

## 2022-03-13 RX ADMIN — NITROGLYCERIN PRN MG: 0.4 TABLET SUBLINGUAL at 11:21

## 2022-03-13 RX ADMIN — HEPARIN SODIUM SCH UNIT: 5000 INJECTION, SOLUTION INTRAVENOUS; SUBCUTANEOUS at 14:00

## 2022-03-13 NOTE — PHYS DOC
Past Medical History


Past Medical History:  GERD, High Cholesterol, Heart Disease, Hypertension, MI


Past Surgical History:  Other


Additional Past Surgical Histo:  MYOMECTOMY


Smoking Status:  Never Smoker


Alcohol Use:  None


Drug Use:  None





General Adult


HPI:


HPI:





Patient is a 71  year old male who presents with intermittent chest pain, as 

well as left-sided thoracic back pain.  Symptoms began Thursday, progressively 

worsened.  She reported the pain usually occurred with movement and exertion, 

but the pain worsened acutely today, and it is occurring at rest.  She reports 

mild associated dyspnea.  She does describe some mild pleuritic pain.  She 

denies cough or hemoptysis.  She denies diaphoresis, abdominal pain, nausea or 

vomiting.  She denies dizziness, syncope.  She denies lower extremity pain or 

swelling.  She has had a previous non-ST elevation MI several years ago, she has

a stent, she describes her right coronary artery stent.  She reports compliance 

with all medication.  She has not been on Plavix for many years.  She does have 

a cardiologist at [facility].  She denies having any recent provocative 

testing, such as stress testing or recent cardiac catheterization.  She admits 

that the pain she is experiencing currently does not feel like her previous MI 

symptoms.





Review of Systems:


Review of Systems:


Constitutional:   Denies fever or chills. []


HENT:   Denies nasal congestion or sore throat. [] 


Respiratory:   Denies cough or hemoptysis.  Mild shortness of breath.


Cardiovascular: Chest pain 


GI:   Denies abdominal pain, nausea, vomiting


:  Denies urinary symptoms


Musculoskeletal:   Denies back pain or joint pain. [] 


Integument:   Denies rash. [] 


Neurologic:   Denies headache, focal weakness or sensory changes. Denies 

dizziness or syncope.


Psychiatric:  Denies depression or anxiety. []





Heart Score:


C/O Chest Pain:  Yes


HEART Score for Chest Pain:  








HEART Score for Chest Pain Response (Comments) Value


 


History Moderately Suspicious 1


 


ECG Nonspecific Repolarizatio 1


 


Age > 65 2


 


Risk Factors >3 Risk Factors or Hx CAD 2


 


Total  6








Risk Factors:


Risk Factors:  DM, Current or recent (<one month) smoker, HTN, HLP, family 

history of CAD, obesity.


Risk Scores:


Score 0 - 3:  2.5% MACE over next 6 weeks - Discharge Home


Score 4 - 6:  20.3% MACE over next 6 weeks - Admit for Clinical Observation


Score 7 - 10:  72.7% MACE over next 6 weeks - Early Invasive Strategies





Allergies:


Allergies:





Allergies








Coded Allergies Type Severity Reaction Last Updated Verified


 


  Sulfa (Sulfonamide Antibiotics) Allergy Intermediate Hives 18 Yes


 


  erythromycin base Adverse Reaction Intermediate Nausea and Vomiting 18 

Yes











Physical Exam:


PE:





Constitutional: Well developed, well nourished, no acute distress, non-toxic 

appearance. []


HENT: Normocephalic, atraumatic


Eyes: Sclera are clear and anicteric.


Neck: Normal range of motion, no tenderness, supple, no stridor.  Achy midline. 

 No JVD


Cardiovascular:Heart rate regular rhythm, +2 radial and +2 posterior tibial 

pulses bilaterally.  No edema.  Warm and well-perfused


Lungs & Thorax:  Bilateral breath sounds clear to auscultation, no rales, 

rhonchi or wheezes.  Equal chest rise.  No evidence of chest wall trauma


Abdomen: Abdomen is soft, nondistended, nontender to palpation.  No palpable 

masses organomegaly.  No palpable pulsatile mass.  No CVA tenderness.


Skin: Warm, dry, no erythema, no rash. [] 


Back: No tenderness, no CVA tenderness. [] 


Extremities: No tenderness, no cyanosis, no clubbing, ROM intact, no edema.  No 

calf tenderness.


Neurologic: Alert and oriented X 3, normal motor function, normal sensory 

function, no focal deficits noted. []


Psychologic: Affect normal, judgement normal, mood normal.  He is pleasant and 

cooperative





EKG:


EKG:


EKG is interpreted at 1040


Rhythm is sinus


Rate is 74 bpm


Axis is left


No STEMI





Radiology/Procedures:


Radiology/Procedures:


                                        


                                 IMAGING REPORT





                                     Signed





PATIENT: ML ARAUZUNT: [id number]     MRN#: [id number]


: 1950           LOCATION: ER              AGE: 71


SEX: F                    EXAM DT: 22         ACCESSION#: [id number]


STATUS: PRE ER            ORD. PHYSICIAN: JOSHUA WELCH DO


REASON: chest pain, back pain


PROCEDURE: PORTABLE CHEST 1V





EXAM: Chest, single view.





HISTORY: Chest pain.





COMPARISON: 2018





FINDINGS: A frontal view of the chest is obtained. There is no infiltrate, 

pleural effusion or pneumothorax. The heart is normal in size.





IMPRESSION: No acute pulmonary finding.





Electronically signed by: Jose Ramirez MD (3/13/2022 11:10 AM) [facility]














DICTATED and SIGNED BY:     JOSE RAMIREZ MD


DATE:     22 [id number] 0








                                 IMAGING REPORT





                                     Signed





PATIENT: ML ARAUZUNT: [id number]     MRN#: [id number]


: 1950           LOCATION: 6 [facility]         AGE: 71


SEX: F                    EXAM DT: 22         ACCESSION#: [id number]


STATUS: ADM IN            ORD. PHYSICIAN: JOSHUA WELCH DO


REASON: chest pain, back pain, elevated d dimer;OMNI 350,100ML


PROCEDURE: CT ANGIOGRAPHY CHEST








CTA scan of the Chest with Contrast (Pulmonary Embolism protocol) 3/13/2020 2





Clinical History: Chest pain. Elevated d-dimer.





Technique: After the intravenous administration of 100 cc of Omnipaque 350, 

contiguous, 0.625 mm axial sections were obtained through the chest. 2  mm axial

 and 3D MIP coronal and sagittal reconstructed images were obtained. 





One or more of the following individualized dose reduction techniques were 

utilized for this study:





1. Automated exposure control.


2. Adjustment of the mA and/or kV according to patient size.


3. Use of iterative reconstruction technique.











Findings: Comparison is made to patient's portable chest radiograph performed 

earlier today.





No filling defect is seen within the major branches of either pulmonary artery. 

There is no CT evidence of pulmonary embolism. The heart is mildly enlarged. 

Atherosclerotic calcification thoracic aorta is seen. There is a common origin 

of the left common carotid and right common carotid arteries from the thoracic 

aortic arch. The right subclavian artery originates as the last branch of the 

thoracic aortic arch and passes posterior to the thoracic esophagus. These 

findings are normal variations. The thyroid gland is slightly heterogeneous.





Minimal dependent subsegmental atelectasis is seen involving both lungs. No area

 of consolidation, pleural effusion or pneumothorax is seen.





Images through the upper abdomen demonstrate decreased attenuation liver 

parenchyma consistent with fatty infiltration. The 2 cm somewhat oval-shaped 

low-attenuation structure is seen involving the lateral aspect of the right 

lower liver which may represent a hepatic cyst. Degenerative changes are seen 

involving the thoracic spine.





Impression: There is no CT evidence of pulmonary embolism.





Electronically signed by: Faisal Arias MD (3/13/2022 1:05 PM) [id number]














DICTATED and SIGNED BY:     FAISAL ARIAS MD


DATE:     22 [id number] 0





Course & Med Decision Making:


Course & Med Decision Making


Pertinent Labs and Imaging studies reviewed. (See chart for details)





Patient had not yet taken her aspirin today, so she is given this.  She is given

 sublingual nitroglycerin.  She reports improvement in pain.  Blood pressure is 

stable and improved.  I have discussed the findings, differential diagnosis and 

plan of care with her.  She is comfortable to plan for admission, cardiology 

consultation and serial troponin exams.  She is accepted for admission by Dr. Gibson.





Xin Disclaimer:


Dragon Disclaimer:


This electronic medical record was generated, in whole or in part, using a voice

 recognition dictation system.





Departure


Departure


Impression:  


   Primary Impression:  


   Chest pain


   Qualified Codes:  R07.9 - Chest pain, unspecified


   Additional Impressions:  


   Coronary artery disease


   Qualified Codes:  I25.10 - Atherosclerotic heart disease of native coronary 

   artery without angina pectoris


   History of myocardial infarction


Disposition:   ADMITTED AS INPATIENT


Admitting Physician:  SHELLY (Dr. Gibson)


Condition:  STABLE


Referrals:  


JOCELYN HENDRIX MD (PCP)











JOSHUA WELCH DO             Mar 13, 2022 10:38

## 2022-03-13 NOTE — EKG
[facility]

              [address] [place] [address]

Test Date:    2022               Test Time:    10:40:49

Pat Name:     ML ARAUZ          Department:   

Patient ID:   [id number]           Room:          

Gender:       F                        Technician:   

:          1950               Requested By: JOSHUA WELCH

Order Number: [id number]           Reading MD:     

                                 Measurements

Intervals                              Axis          

Rate:         74                       P:            26

WY:           192                      QRS:          -24

QRSD:         88                       T:            8

QT:           378                                    

QTc:          420                                    

                           Interpretive Statements

SINUS RHYTHM

LEFT ATRIAL ABNORMALITY

LEFTWARD AXIS

ABNORMAL ECG

RI6.02

Compared to ECG 2022 10:39:40

No significant changes

## 2022-03-13 NOTE — PDOC1
History and Physical


Date of Service:


DOS:


DATE: 3/13/22 


TIME: 19:24





Chief Complaint:


Chief Complain:


Chest pain back pain





History of Present Illness:


HPI:


Patient is a 71  year old female who presents with intermittent chest pain, as 

well as left-sided thoracic back pain.  Symptoms began Thursday, progressively 

worsened.  She reported the pain usually occurred with movement and exertion, 

but the pain worsened acutely today, and it is occurring at rest.  She reports 

mild associated dyspnea.  She does describe some mild pleuritic pain.  She 

denies cough or hemoptysis.  She denies dizziness, syncope.  She denies lower 

extremity pain or swelling.  She has had a previous non-ST elevation MI several 

years ago, she has a stent, she describes her right coronary artery stent.  She 

reports compliance with all medication.  She has not been on Plavix for many 

years.  She does have a cardiologist at [facility].  She denies having 

any recent provocative testing, such as stress testing or recent cardiac 

catheterization.  She admits that the pain she is experiencing currently does 

not feel like her previous MI symptoms.





When I was able to evaluate the patient on the floor she was in bed eating 

dinner.  Said chest pain had improved.  Was still feeling some back pain.  Said 

that she previously used to be on Prilosec and was taken off this a few months 

ago.  She is wondering if her symptoms are related to heartburn





Past Medical/Surgical History:


PMH/PSH:


Past Medical History:  GERD, High Cholesterol, Heart Disease, Hypertension, MI


Past Surgical History:  Other


Additional Past Surgical Histo:  MYOMECTOMY


Smoking Status:  Never Smoker


Alcohol Use:  None


Drug Use:  None





Allergies:


Allergies:  


Coded Allergies:  


     Sulfa (Sulfonamide Antibiotics) (Verified  Allergy, Intermediate, Hives, 

8/19/18)


     erythromycin base (Verified  Adverse Reaction, Intermediate, Nausea and 

Vomiting, 8/19/18)





Family History:


Family History:


Hypertension





Current Medications:


Current Medications





Current Medications


Aspirin (Aspirin Chewable) 324 mg 1X  ONCE PO  Last administered on 3/13/22at 

11:19;  Start 3/13/22 at 11:00;  Stop 3/13/22 at 11:01;  Status DC


Nitroglycerin (Nitrostat) 0.4 mg PRN Q5MIN  PRN SL CHEST PAIN Last administered 

on 3/13/22at 11:35;  Start 3/13/22 at 11:00


Iohexol (Omnipaque 350 Mg/ml) 100 ml 1X  ONCE IV  Last administered on 3/13/22at

12:28;  Start 3/13/22 at 12:00;  Stop 3/13/22 at 12:01;  Status DC


Ondansetron HCl (Zofran) 4 mg PRN Q8HRS  PRN IVP NAUSEA/VOMITING;  Start 3/13/22

at 13:15;  Stop 3/14/22 at 13:14


Morphine Sulfate (Morphine Sulfate) 4 mg PRN Q2HR  PRN IV PAIN;  Start 3/13/22 

at 13:15


Acetaminophen (Tylenol) 650 mg PRN Q6HRS  PRN PO MILD PAIN / TEMP > 100.3'F;  

Start 3/13/22 at 13:15


Aspirin (Aspirin Chewable) 81 mg DAILY PO ;  Start 3/14/22 at 09:00


EZETIMIBE (Zetia) 10 mg HS PO ;  Start 3/13/22 at 21:00


Verapamil HCl (Calan Sr) 180 mg DAILY PO ;  Start 3/14/22 at 09:00


Atorvastatin Calcium (Lipitor) 40 mg QHS PO ;  Start 3/13/22 at 21:00


Metoprolol Succinate (Toprol Xl) 25 mg DAILY PO ;  Start 3/14/22 at 09:00


Ondansetron HCl (Zofran) 4 mg PRN Q6HRS  PRN IVP NAUSEA/VOMITING;  Start 3/13/22

at 14:00


Calcium Carbonate/ Glycine (Tums) 500 mg PRN Q3HRS  PRN PO UPSET STOMACH;  Start

3/13/22 at 14:00


Info (Non-Icu Electrolyte Protocol) 1 ea PRN DAILY  PRN MC SEE COMMENTS;  Start 

3/13/22 at 14:00


Oxycodone/ Acetaminophen (Percocet 5/325) 1 tab PRN Q4HRS  PRN PO MILD PAIN, 1ST

CHOICE;  Start 3/13/22 at 14:00


Oxycodone/ Acetaminophen (Percocet 5/325) 2 tab PRN Q4HRS  PRN PO MODERATE PAIN,

SEVERE PAIN;  Start 3/13/22 at 14:00


Acetaminophen (Tylenol) 650 mg PRN Q6HRS  PRN PO Headaches, Temp > 101.5F;  

Start 3/13/22 at 14:00


Senna/Docusate Sodium (Senna Plus) 1 tab BID PO ;  Start 3/13/22 at 21:00


Heparin Sodium (Porcine) (Heparin Sodium) 5,000 unit Q8HRS SQ ;  Start 3/13/22 

at 14:00





Active Scripts


Active


Reported


Fish Oil 1,000 Mg Softgel (Omega-3 Fatty Acids/Fish Oil) 1 Each Capsule 1 Cap PO

BID 30 Days


     WITH MEALS


Zetia (Ezetimibe) 10 Mg Tablet 1 Tab PO HS 30 Days


D3 + K2 Dots 1,000 Units Tab (Vitamin D3/Vitamin K2) 1 Each Tab.rapdis 1 Tab PO 

DAILY 30 Days


Verapamil Er (Verapamil Hcl) 180 Mg Tablet.er 180 Mg PO DAILY


Crestor (Rosuvastatin Calcium) 40 Mg Tablet 40 Mg PO HS


Metoprolol Succinate ( Xl ) (Metoprolol Succinate) 25 Mg Tab.er.24h Unknown Dose

PO DAILY


Aspirin 81 Mg Tab.chew 1 Tab PO DAILY





ROS:


Review of Systems


Review of System


Unless noted in HPI 14 point review of systems was negative





Physical Exam:


Vital Signs:





Vital Signs








  Date Time  Temp Pulse Resp B/P (MAP) Pulse Ox O2 Delivery O2 Flow Rate FiO2


 


3/13/22 19:00 97.7 64 18 107/67 (80) 98 Room Air  





 97.7       








Physcial Exam:


GEN:   No apparent distress.  Alert and oriented


HEENT:   Normal cephalic, atraumatic, external auditory canals are patent


EYES:   Extraocular muscles are intact, pupil are equally round and reactive to 

light and accommodation


MUSCULOSKELETAL:  Well developed , well nourished, good range of motion


ENDOCRINE:   No thyromegaly was palpated


LYMPHATICS:   No cervical chain or axillary nodes were noted


HEMATOPOIETIC:  No bruising


NECK:   Supple, no JVD, no thyromegaly was noted


LUNGS:   Clear to auscultation in all lung fields without rhonchi or wheezing


HEART:    RRR, S!, S2 present.  Peripheral pulses intact, no obvious murmurs 

noted


ABDOMEN:   Soft, nontender.  Positive bowel sounds, no organomegaly, normal 

bowel sounds


EXTREMITIES:   Without clubbing, cyanosis, or edema.  Pedal pulses intact.  

Negative Homans sign


NEUROLOGIC:   Normal speech and tone.  A&O x 3, moves all extremities, no 

obvious focal deficits


PSYCHIATRIC:   Normal affect, normal mood. Stable


SKIN:   No ulcerations or rashes, good skin turgor, no jaundice


VASCULAR:   Good capillary refill, neurovascular bundle appears to be intact





Labs:


Labs:





Laboratory Tests








Test


 3/13/22


10:46 3/13/22


15:00 3/13/22


16:35


 


White Blood Count


 6.0 x10^3/uL


(4.0-11.0) 


 





 


Red Blood Count


 4.60 x10^6/uL


(3.50-5.40) 


 





 


Hemoglobin


 13.8 g/dL


(12.0-15.5) 


 





 


Hematocrit


 43.5 %


(36.0-47.0) 


 





 


Mean Corpuscular Volume 95 fL ()   


 


Mean Corpuscular Hemoglobin 30 pg (25-35)   


 


Mean Corpuscular Hemoglobin


Concent 32 g/dL


(31-37) 


 





 


Red Cell Distribution Width


 14.3 %


(11.5-14.5) 


 





 


Platelet Count


 150 x10^3/uL


(140-400) 


 





 


Neutrophils (%) (Auto) 62 % (31-73)   


 


Lymphocytes (%) (Auto) 29 % (24-48)   


 


Monocytes (%) (Auto) 8 % (0-9)   


 


Eosinophils (%) (Auto) 1 % (0-3)   


 


Basophils (%) (Auto) 1 % (0-3)   


 


Neutrophils # (Auto)


 3.7 x10^3/uL


(1.8-7.7) 


 





 


Lymphocytes # (Auto)


 1.7 x10^3/uL


(1.0-4.8) 


 





 


Monocytes # (Auto)


 0.5 x10^3/uL


(0.0-1.1) 


 





 


Eosinophils # (Auto)


 0.1 x10^3/uL


(0.0-0.7) 


 





 


Basophils # (Auto)


 0.0 x10^3/uL


(0.0-0.2) 


 





 


D-Dimer (Aurora)


 1.25 ug/mlFEU


(0.00-0.50) 


 





 


Sodium Level


 143 mmol/L


(136-145) 


 





 


Potassium Level


 3.7 mmol/L


(3.5-5.1) 


 





 


Chloride Level


 104 mmol/L


() 


 





 


Carbon Dioxide Level


 28 mmol/L


(21-32) 


 





 


Anion Gap 11 (6-14)   


 


Blood Urea Nitrogen


 13 mg/dL


(7-20) 


 





 


Creatinine


 0.7 mg/dL


(0.6-1.0) 


 





 


Estimated GFR


(Cockcroft-Gault) 100.4 


 


 





 


BUN/Creatinine Ratio 19 (6-20)   


 


Glucose Level


 124 mg/dL


(70-99) 


 





 


Calcium Level


 8.9 mg/dL


(8.5-10.1) 


 





 


Magnesium Level


 1.9 mg/dL


(1.8-2.4) 


 





 


Total Bilirubin


 0.5 mg/dL


(0.2-1.0) 


 





 


Aspartate Amino Transf


(AST/SGOT) 27 U/L (15-37) 


 


 





 


Alanine Aminotransferase


(ALT/SGPT) 43 U/L (14-59) 


 


 





 


Alkaline Phosphatase


 91 U/L


() 


 





 


Troponin I High Sensitivity 7 ng/L (4-50)  9 ng/L (4-50)  7 ng/L (4-50) 


 


NT-Pro-B-Type Natriuretic


Peptide 114 pg/mL


(0-124) 


 





 


Total Protein


 7.6 g/dL


(6.4-8.2) 


 





 


Albumin


 3.7 g/dL


(3.4-5.0) 


 





 


Albumin/Globulin Ratio 0.9 (1.0-1.7)   


 


Lipase


 91 U/L


() 


 











Laboratory Tests








Test


 3/13/22


10:46 3/13/22


15:00 3/13/22


16:35


 


White Blood Count


 6.0 x10^3/uL


(4.0-11.0) 


 





 


Red Blood Count


 4.60 x10^6/uL


(3.50-5.40) 


 





 


Hemoglobin


 13.8 g/dL


(12.0-15.5) 


 





 


Hematocrit


 43.5 %


(36.0-47.0) 


 





 


Mean Corpuscular Volume 95 fL ()   


 


Mean Corpuscular Hemoglobin 30 pg (25-35)   


 


Mean Corpuscular Hemoglobin


Concent 32 g/dL


(31-37) 


 





 


Red Cell Distribution Width


 14.3 %


(11.5-14.5) 


 





 


Platelet Count


 150 x10^3/uL


(140-400) 


 





 


Neutrophils (%) (Auto) 62 % (31-73)   


 


Lymphocytes (%) (Auto) 29 % (24-48)   


 


Monocytes (%) (Auto) 8 % (0-9)   


 


Eosinophils (%) (Auto) 1 % (0-3)   


 


Basophils (%) (Auto) 1 % (0-3)   


 


Neutrophils # (Auto)


 3.7 x10^3/uL


(1.8-7.7) 


 





 


Lymphocytes # (Auto)


 1.7 x10^3/uL


(1.0-4.8) 


 





 


Monocytes # (Auto)


 0.5 x10^3/uL


(0.0-1.1) 


 





 


Eosinophils # (Auto)


 0.1 x10^3/uL


(0.0-0.7) 


 





 


Basophils # (Auto)


 0.0 x10^3/uL


(0.0-0.2) 


 





 


D-Dimer (Aurora)


 1.25 ug/mlFEU


(0.00-0.50) 


 





 


Sodium Level


 143 mmol/L


(136-145) 


 





 


Potassium Level


 3.7 mmol/L


(3.5-5.1) 


 





 


Chloride Level


 104 mmol/L


() 


 





 


Carbon Dioxide Level


 28 mmol/L


(21-32) 


 





 


Anion Gap 11 (6-14)   


 


Blood Urea Nitrogen


 13 mg/dL


(7-20) 


 





 


Creatinine


 0.7 mg/dL


(0.6-1.0) 


 





 


Estimated GFR


(Cockcroft-Gault) 100.4 


 


 





 


BUN/Creatinine Ratio 19 (6-20)   


 


Glucose Level


 124 mg/dL


(70-99) 


 





 


Calcium Level


 8.9 mg/dL


(8.5-10.1) 


 





 


Magnesium Level


 1.9 mg/dL


(1.8-2.4) 


 





 


Total Bilirubin


 0.5 mg/dL


(0.2-1.0) 


 





 


Aspartate Amino Transf


(AST/SGOT) 27 U/L (15-37) 


 


 





 


Alanine Aminotransferase


(ALT/SGPT) 43 U/L (14-59) 


 


 





 


Alkaline Phosphatase


 91 U/L


() 


 





 


Troponin I High Sensitivity 7 ng/L (4-50)  9 ng/L (4-50)  7 ng/L (4-50) 


 


NT-Pro-B-Type Natriuretic


Peptide 114 pg/mL


(0-124) 


 





 


Total Protein


 7.6 g/dL


(6.4-8.2) 


 





 


Albumin


 3.7 g/dL


(3.4-5.0) 


 





 


Albumin/Globulin Ratio 0.9 (1.0-1.7)   


 


Lipase


 91 U/L


() 


 














Assessment/Plan


Assessment/Plan


Chest pain suspect secondary to GERD vs ACS?, significant cardiac history.  

History hypertension hyperlipidemia





-Admit to telemetry floor


-Trend troponins, although suspect pain is not cardiac in nature


-As needed pain control


-Start Protonix


-PT OT


-Home meds resumed as indicated


-DVT prophylaxis





Justifications for Admission


Other Justification














SUHAS PALM MD         Mar 13, 2022 19:24

## 2022-03-13 NOTE — RAD
CTA scan of the Chest with Contrast (Pulmonary Embolism protocol) 3/13/2020 2



Clinical History: Chest pain. Elevated d-dimer.



Technique: After the intravenous administration of 100 cc of Omnipaque 350, contiguous, 0.625 mm axia
l sections were obtained through the chest. 2  mm axial and 3D MIP coronal and sagittal reconstructed
 images were obtained. 



One or more of the following individualized dose reduction techniques were utilized for this study:



1. Automated exposure control.

2. Adjustment of the mA and/or kV according to patient size.

3. Use of iterative reconstruction technique.







Findings: Comparison is made to patient's portable chest radiograph performed earlier today.



No filling defect is seen within the major branches of either pulmonary artery. There is no CT eviden
ce of pulmonary embolism. The heart is mildly enlarged. Atherosclerotic calcification thoracic aorta 
is seen. There is a common origin of the left common carotid and right common carotid arteries from t
he thoracic aortic arch. The right subclavian artery originates as the last branch of the thoracic ao
rtic arch and passes posterior to the thoracic esophagus. These findings are normal variations. The t
hyroid gland is slightly heterogeneous.



Minimal dependent subsegmental atelectasis is seen involving both lungs. No area of consolidation, pl
eural effusion or pneumothorax is seen.



Images through the upper abdomen demonstrate decreased attenuation liver parenchyma consistent with f
atty infiltration. The 2 cm somewhat oval-shaped low-attenuation structure is seen involving the late
ral aspect of the right lower liver which may represent a hepatic cyst. Degenerative changes are seen
 involving the thoracic spine.



Impression: There is no CT evidence of pulmonary embolism.



Electronically signed by: Faisal Arias MD (3/13/2022 1:05 PM) [id number]

## 2022-03-13 NOTE — RAD
EXAM: Chest, single view.



HISTORY: Chest pain.



COMPARISON: 8/19/2018



FINDINGS: A frontal view of the chest is obtained. There is no infiltrate, pleural effusion or pneumo
thorax. The heart is normal in size.



IMPRESSION: No acute pulmonary finding.



Electronically signed by: Ruthann Forte MD (3/13/2022 11:10 AM) [facility]

## 2022-03-14 VITALS — SYSTOLIC BLOOD PRESSURE: 125 MMHG | DIASTOLIC BLOOD PRESSURE: 68 MMHG

## 2022-03-14 VITALS — DIASTOLIC BLOOD PRESSURE: 76 MMHG | SYSTOLIC BLOOD PRESSURE: 138 MMHG

## 2022-03-14 VITALS — SYSTOLIC BLOOD PRESSURE: 121 MMHG | DIASTOLIC BLOOD PRESSURE: 69 MMHG

## 2022-03-14 RX ADMIN — HEPARIN SODIUM SCH UNIT: 5000 INJECTION, SOLUTION INTRAVENOUS; SUBCUTANEOUS at 06:00

## 2022-03-14 RX ADMIN — SENNOSIDES AND DOCUSATE SODIUM SCH TAB: 8.6; 5 TABLET ORAL at 08:17

## 2022-03-14 NOTE — NUR
SS following for discharge planning. SS reviewed pt chart and discussed with pt RN. Pt is 
from home and is currently on room air. Cardiology following. Discharge order on the chart 
for home with self care.

## 2022-03-14 NOTE — PDOC2
CONSULT


Date of Consult


Date of Consult


DATE: 3/14/22 


TIME: 09:46





Reason for Consult


Reason for Consult:


Chest pain





Referring Physician


Referring Physician:


Dr. Gibson





Identification/Chief Complaint


Chief Complaint


Chest pain





Source


Source:  Chart review, Patient





History of Present Illness


Reason for Visit:


71-year-old female with history of coronary artery disease presented complaining

of mainly back pain but with intermittent episodes of left-sided aching chest 

pain not related to exertion but worse with movement of her body.  She denied 

any orthopnea/PND, palpitations or syncope.  She stated that this is different 

than the pain she had prior to her stent placement in the past.  She usually 

follows with Dr. Obregon at [facility]





Past Medical History


Past Medical History


Coronary artery disease s/p PCI/stent placement to LAD


Hypertension


Hyperlipidemia


GERD





Family History


Family History


Not contributory





Social History


Social History


Patient denied any smoking, alcohol or drug use





Current Problem List


Problem List


Problems


Medical Problems:


(1) Chest pain


Status: Acute  





(2) Coronary artery disease


Status: Acute  





(3) History of myocardial infarction


Status: Acute  











Current Medications


Current Medications





Current Medications


Aspirin (Aspirin Chewable) 324 mg 1X  ONCE PO  Last administered on 3/13/22at 

11:19;  Start 3/13/22 at 11:00;  Stop 3/13/22 at 11:01;  Status DC


Nitroglycerin (Nitrostat) 0.4 mg PRN Q5MIN  PRN SL CHEST PAIN Last administered 

on 3/13/22at 11:35;  Start 3/13/22 at 11:00


Iohexol (Omnipaque 350 Mg/ml) 100 ml 1X  ONCE IV  Last administered on 3/13/22at

12:28;  Start 3/13/22 at 12:00;  Stop 3/13/22 at 12:01;  Status DC


Ondansetron HCl (Zofran) 4 mg PRN Q8HRS  PRN IVP NAUSEA/VOMITING;  Start 3/13/22

at 13:15;  Stop 3/14/22 at 13:14


Morphine Sulfate (Morphine Sulfate) 4 mg PRN Q2HR  PRN IV PAIN;  Start 3/13/22 

at 13:15


Acetaminophen (Tylenol) 650 mg PRN Q6HRS  PRN PO MILD PAIN / TEMP > 100.3'F Last

administered on 3/13/22at 22:17;  Start 3/13/22 at 13:15


Aspirin (Aspirin Chewable) 81 mg DAILY PO  Last administered on 3/14/22at 08:18;

 Start 3/14/22 at 09:00


EZETIMIBE (Zetia) 10 mg HS PO  Last administered on 3/13/22at 22:10;  Start 

3/13/22 at 21:00


Verapamil HCl (Calan Sr) 180 mg DAILY PO  Last administered on 3/14/22at 08:18; 

Start 3/14/22 at 09:00


Atorvastatin Calcium (Lipitor) 40 mg QHS PO  Last administered on 3/13/22at 

22:10;  Start 3/13/22 at 21:00


Metoprolol Succinate (Toprol Xl) 25 mg DAILY PO  Last administered on 3/14/22at 

08:18;  Start 3/14/22 at 09:00


Ondansetron HCl (Zofran) 4 mg PRN Q6HRS  PRN IVP NAUSEA/VOMITING;  Start 3/13/22

at 14:00


Calcium Carbonate/ Glycine (Tums) 500 mg PRN Q3HRS  PRN PO UPSET STOMACH;  Start

3/13/22 at 14:00


Info (Non-Icu Electrolyte Protocol) 1 ea PRN DAILY  PRN MC SEE COMMENTS;  Start 

3/13/22 at 14:00


Oxycodone/ Acetaminophen (Percocet 5/325) 1 tab PRN Q4HRS  PRN PO MILD PAIN, 1ST

CHOICE;  Start 3/13/22 at 14:00


Oxycodone/ Acetaminophen (Percocet 5/325) 2 tab PRN Q4HRS  PRN PO MODERATE PAIN,

SEVERE PAIN;  Start 3/13/22 at 14:00


Acetaminophen (Tylenol) 650 mg PRN Q6HRS  PRN PO Headaches, Temp > 101.5F;  

Start 3/13/22 at 14:00


Senna/Docusate Sodium (Senna Plus) 1 tab BID PO  Last administered on 3/14/22at 

08:17;  Start 3/13/22 at 21:00


Heparin Sodium (Porcine) (Heparin Sodium) 5,000 unit Q8HRS SQ ;  Start 3/13/22 

at 14:00


Pantoprazole Sodium (Protonix) 40 mg DAILYAC PO  Last administered on 3/14/22at 

08:18;  Start 3/14/22 at 07:30





Active Scripts


Active


Reported


Fish Oil 1,000 Mg Softgel (Omega-3 Fatty Acids/Fish Oil) 1 Each Capsule 1 Cap PO

BID 30 Days


     WITH MEALS


Zetia (Ezetimibe) 10 Mg Tablet 1 Tab PO HS 30 Days


D3 + K2 Dots 1,000 Units Tab (Vitamin D3/Vitamin K2) 1 Each Tab.rapdis 1 Tab PO 

DAILY 30 Days


Verapamil Er (Verapamil Hcl) 180 Mg Tablet.er 180 Mg PO DAILY


Crestor (Rosuvastatin Calcium) 40 Mg Tablet 40 Mg PO HS


Metoprolol Succinate ( Xl ) (Metoprolol Succinate) 25 Mg Tab.er.24h Unknown Dose

PO DAILY


Aspirin 81 Mg Tab.chew 1 Tab PO DAILY





Allergies


Allergies:  


Coded Allergies:  


     Sulfa (Sulfonamide Antibiotics) (Verified  Allergy, Intermediate, Hives, 

8/19/18)


     erythromycin base (Verified  Adverse Reaction, Intermediate, Nausea and 

Vomiting, 8/19/18)





ROS


PSYCHOLOGICAL ROS:  No: Hallucinations


Eyes:  No Loss of vision


HEENT:  No: Epistaxis


Respiratory:  No: Shortness of breath


Cardiovascular:  yes Chest Pain


Gastrointestinal:  No Vomiting


Genitourinary:  No Hematuria


Neurological:  No Seizures


Skin:  No Rash





Physical Exam


General:  Alert, Oriented X3


HEENT:  Atraumatic


Lungs:  Clear to auscultation


Heart:  Regular rate


Abdomen:  Soft


Extremities:  No edema


Psych/Mental Status:  Mood NL





Vitals


VITALS





Vital Signs








  Date Time  Temp Pulse Resp B/P (MAP) Pulse Ox O2 Delivery O2 Flow Rate FiO2


 


3/14/22 08:18  62  125/68    


 


3/14/22 08:00      Room Air  


 


3/14/22 07:00 97.6  16  95   





 97.6       











Labs


Labs





Laboratory Tests








Test


 3/13/22


10:46 3/13/22


15:00 3/13/22


16:35


 


White Blood Count


 6.0 x10^3/uL


(4.0-11.0) 


 





 


Red Blood Count


 4.60 x10^6/uL


(3.50-5.40) 


 





 


Hemoglobin


 13.8 g/dL


(12.0-15.5) 


 





 


Hematocrit


 43.5 %


(36.0-47.0) 


 





 


Mean Corpuscular Volume 95 fL ()   


 


Mean Corpuscular Hemoglobin 30 pg (25-35)   


 


Mean Corpuscular Hemoglobin


Concent 32 g/dL


(31-37) 


 





 


Red Cell Distribution Width


 14.3 %


(11.5-14.5) 


 





 


Platelet Count


 150 x10^3/uL


(140-400) 


 





 


Neutrophils (%) (Auto) 62 % (31-73)   


 


Lymphocytes (%) (Auto) 29 % (24-48)   


 


Monocytes (%) (Auto) 8 % (0-9)   


 


Eosinophils (%) (Auto) 1 % (0-3)   


 


Basophils (%) (Auto) 1 % (0-3)   


 


Neutrophils # (Auto)


 3.7 x10^3/uL


(1.8-7.7) 


 





 


Lymphocytes # (Auto)


 1.7 x10^3/uL


(1.0-4.8) 


 





 


Monocytes # (Auto)


 0.5 x10^3/uL


(0.0-1.1) 


 





 


Eosinophils # (Auto)


 0.1 x10^3/uL


(0.0-0.7) 


 





 


Basophils # (Auto)


 0.0 x10^3/uL


(0.0-0.2) 


 





 


D-Dimer (Aurora)


 1.25 ug/mlFEU


(0.00-0.50) 


 





 


Sodium Level


 143 mmol/L


(136-145) 


 





 


Potassium Level


 3.7 mmol/L


(3.5-5.1) 


 





 


Chloride Level


 104 mmol/L


() 


 





 


Carbon Dioxide Level


 28 mmol/L


(21-32) 


 





 


Anion Gap 11 (6-14)   


 


Blood Urea Nitrogen


 13 mg/dL


(7-20) 


 





 


Creatinine


 0.7 mg/dL


(0.6-1.0) 


 





 


Estimated GFR


(Cockcroft-Gault) 100.4 


 


 





 


BUN/Creatinine Ratio 19 (6-20)   


 


Glucose Level


 124 mg/dL


(70-99) 


 





 


Calcium Level


 8.9 mg/dL


(8.5-10.1) 


 





 


Magnesium Level


 1.9 mg/dL


(1.8-2.4) 


 





 


Total Bilirubin


 0.5 mg/dL


(0.2-1.0) 


 





 


Aspartate Amino Transf


(AST/SGOT) 27 U/L (15-37) 


 


 





 


Alanine Aminotransferase


(ALT/SGPT) 43 U/L (14-59) 


 


 





 


Alkaline Phosphatase


 91 U/L


() 


 





 


Troponin I High Sensitivity 7 ng/L (4-50)  9 ng/L (4-50)  7 ng/L (4-50) 


 


NT-Pro-B-Type Natriuretic


Peptide 114 pg/mL


(0-124) 


 





 


Total Protein


 7.6 g/dL


(6.4-8.2) 


 





 


Albumin


 3.7 g/dL


(3.4-5.0) 


 





 


Albumin/Globulin Ratio 0.9 (1.0-1.7)   


 


Lipase


 91 U/L


() 


 











Laboratory Tests








Test


 3/13/22


10:46 3/13/22


15:00 3/13/22


16:35


 


White Blood Count


 6.0 x10^3/uL


(4.0-11.0) 


 





 


Red Blood Count


 4.60 x10^6/uL


(3.50-5.40) 


 





 


Hemoglobin


 13.8 g/dL


(12.0-15.5) 


 





 


Hematocrit


 43.5 %


(36.0-47.0) 


 





 


Mean Corpuscular Volume 95 fL ()   


 


Mean Corpuscular Hemoglobin 30 pg (25-35)   


 


Mean Corpuscular Hemoglobin


Concent 32 g/dL


(31-37) 


 





 


Red Cell Distribution Width


 14.3 %


(11.5-14.5) 


 





 


Platelet Count


 150 x10^3/uL


(140-400) 


 





 


Neutrophils (%) (Auto) 62 % (31-73)   


 


Lymphocytes (%) (Auto) 29 % (24-48)   


 


Monocytes (%) (Auto) 8 % (0-9)   


 


Eosinophils (%) (Auto) 1 % (0-3)   


 


Basophils (%) (Auto) 1 % (0-3)   


 


Neutrophils # (Auto)


 3.7 x10^3/uL


(1.8-7.7) 


 





 


Lymphocytes # (Auto)


 1.7 x10^3/uL


(1.0-4.8) 


 





 


Monocytes # (Auto)


 0.5 x10^3/uL


(0.0-1.1) 


 





 


Eosinophils # (Auto)


 0.1 x10^3/uL


(0.0-0.7) 


 





 


Basophils # (Auto)


 0.0 x10^3/uL


(0.0-0.2) 


 





 


D-Dimer (Aurora)


 1.25 ug/mlFEU


(0.00-0.50) 


 





 


Sodium Level


 143 mmol/L


(136-145) 


 





 


Potassium Level


 3.7 mmol/L


(3.5-5.1) 


 





 


Chloride Level


 104 mmol/L


() 


 





 


Carbon Dioxide Level


 28 mmol/L


(21-32) 


 





 


Anion Gap 11 (6-14)   


 


Blood Urea Nitrogen


 13 mg/dL


(7-20) 


 





 


Creatinine


 0.7 mg/dL


(0.6-1.0) 


 





 


Estimated GFR


(Cockcroft-Gault) 100.4 


 


 





 


BUN/Creatinine Ratio 19 (6-20)   


 


Glucose Level


 124 mg/dL


(70-99) 


 





 


Calcium Level


 8.9 mg/dL


(8.5-10.1) 


 





 


Magnesium Level


 1.9 mg/dL


(1.8-2.4) 


 





 


Total Bilirubin


 0.5 mg/dL


(0.2-1.0) 


 





 


Aspartate Amino Transf


(AST/SGOT) 27 U/L (15-37) 


 


 





 


Alanine Aminotransferase


(ALT/SGPT) 43 U/L (14-59) 


 


 





 


Alkaline Phosphatase


 91 U/L


() 


 





 


Troponin I High Sensitivity 7 ng/L (4-50)  9 ng/L (4-50)  7 ng/L (4-50) 


 


NT-Pro-B-Type Natriuretic


Peptide 114 pg/mL


(0-124) 


 





 


Total Protein


 7.6 g/dL


(6.4-8.2) 


 





 


Albumin


 3.7 g/dL


(3.4-5.0) 


 





 


Albumin/Globulin Ratio 0.9 (1.0-1.7)   


 


Lipase


 91 U/L


() 


 














Assessment/Plan


Assessment/Plan


1.  Chest pain with atypical features, most probably musculoskeletal.  

Myocardial infarction has been ruled out.  Consider ischemic evaluation as an 

outpatient with primary cardiologist.


2.  Coronary artery disease s/p PCI/stent placement to LAD in the past with 

cardiac catheterization in 2018 showing patent stent.  Continue current se

condary prevention measures.


3.  Hypertension: Controlled


4.  Hyperlipidemia: Continue statins and Zetia


5.  GERD: Continue PPIs


Thank you for your consultation











CLARITA MENENDEZ MD           Mar 14, 2022 09:46

## 2022-03-14 NOTE — SNU/HH DC
DISCHARGE WITH HOME HEALTH


DISCHARGE INFORMATION:


Final Diagnosis:


Problems


Medical Problems:


(1) Chest pain


Status: Acute  





(2) Coronary artery disease


Status: Acute  





(3) History of myocardial infarction


Status: Acute  








Condition on Discharge:  Stable





CODE STATUS:


Code Status:  Full





HOME HEALTH:


Face to Face:


I certify this patient is under my care and that I, or a nurse practitioner or 

physician's assistant working with me, had a face to face encounter that meets 

the physician face to face encounter requirements with this patient on [].


Medical Complications:  Other (Weakness)


Skilled Nursing For:  Assess Cardiopulm Status


RN For Eval/Treatment:  Yes


Physical Therapy For:  Evalulation/Treatment


Occupational Therapy For:  Evaluation/Treatment


Home Health Aide For:  Self-care


MSW For:  Community Resources


Pt Meets Homebound Status:  Poor coordination w/ amb.





POST DISCHARGE ORDERS:


Activity Instructions for Disc:  Activity as tolerated


DIET AFTER DISCHARGE:  Cardiac





CERTIFICATION STATEMENT:


Certification Statement:


Certification Statement: Based on the above finding, I certify that this patient

 is confined to the home and needs intermittent skilled nursing care, physical 

therapy and/or speech therapy, or continues to need occupational therapy.~ This 

patient is under my care, and I have initiated the establishment of the plan of 

care.~ This patient will be followed by myself or a community physician who will

 periodically review the plan of care.


Home Meds


Active Scripts


Pantoprazole Sodium (PANTOPRAZOLE SODIUM  **) 40 Mg Tablet.dr, 40 MG PO DAILYAC 

for . for 30 Days, #30 TAB.SR


   Prov:CASTLE,NIAL K III DO         3/14/22


Nitroglycerin (NITROSTAT) 0.4 Mg Tab.subl, 0.4 MG SL PRN Q5MIN PRN for CHEST 

PAIN for 30 Days, #25 TAB


   Prov:CASTLE,NIAL K III DO         3/14/22


Reported Medications


Omega-3 Fatty Acids/Fish Oil (FISH OIL 1,000 MG SOFTGEL) 1 Each Capsule, 1 CAP 

PO BID for cardiac for 30 Days, #60 CAP 0 Refills


   WITH MEALS


   3/13/22


Ezetimibe (ZETIA) 10 Mg Tablet, 1 TAB PO HS for cholesterol for 30 Days, #30 TAB

 0 Refills


   3/13/22


Vitamin D3/Vitamin K2 (D3 + K2 DOTS 1,000 UNITS TAB) 1 Each Tab.rapdis, 1 TAB PO

 DAILY for replacement for 30 Days, #30 TAB 0 Refills


   3/13/22


Verapamil Hcl (VERAPAMIL ER) 180 Mg Tablet.er, 180 MG PO DAILY, TAB.SR


   8/19/18


Rosuvastatin Calcium (CRESTOR) 40 Mg Tablet, 40 MG PO HS for FOR CHOLESTEROL, 

#30 TAB 0 Refills


   8/19/18


Metoprolol Succinate (METOPROLOL SUCCINATE ( XL )) 25 Mg Tab.er.24h, PO DAILY, 

#30 TAB 5 Refills


   8/19/18


Aspirin (ASPIRIN) 81 Mg Tab.chew, 1 TAB PO DAILY, #30 TAB 3 Refills


   8/19/18











JACQUI CHRISTINA III DO           Mar 14, 2022 11:14

## 2022-03-14 NOTE — NUR
DISCHARGED TO HOME. DISCHARGE INSTRUCTIONS GIVEN. PIV AND HEART MONITOR REMOVED. ESCORTED 
PATIENT OFF UNIT PER WHEELCHAIR INTO A PRIVATE VEHICLE.

## 2022-03-15 NOTE — DS
DATE OF DISCHARGE: 03/14/2022

ADMITTING DIAGNOSIS:  Chest pain.



DISCHARGE DIAGNOSES:  Atypical chest pain, previous history of myocardial 

infarction.



CONSULTS:  Cardiology.



PROCEDURES:  None.



HOSPITAL COURSE:  The patient is a pleasant 71-year-old female who presented 

with chest pain.  We trended the troponins.  Consult Cardiology.  Check an echo 

and yesterday when I saw and examined the patient, she was doing well.  

Cardiology felt like this was musculoskeletal.  We discharged to home.



DISPOSITION:  Home.



ACTIVITY:  As tolerated.



DIET:  Low sodium.



DISCHARGE MEDICATIONS:  Please see the MRAD.



TOTAL TIME:  34 minutes.







SHANLE/JAYY/MOH

DR: SHANEL/roxanne   DD: 03/15/2022 12:27

DT: 03/15/2022 12:43   TID: [id number]